# Patient Record
Sex: FEMALE | Race: BLACK OR AFRICAN AMERICAN | NOT HISPANIC OR LATINO
[De-identification: names, ages, dates, MRNs, and addresses within clinical notes are randomized per-mention and may not be internally consistent; named-entity substitution may affect disease eponyms.]

---

## 2017-04-06 ENCOUNTER — TRANSCRIPTION ENCOUNTER (OUTPATIENT)
Age: 22
End: 2017-04-06

## 2020-08-06 ENCOUNTER — EMERGENCY (EMERGENCY)
Facility: HOSPITAL | Age: 25
LOS: 1 days | Discharge: HOME | End: 2020-08-06
Attending: EMERGENCY MEDICINE | Admitting: EMERGENCY MEDICINE
Payer: COMMERCIAL

## 2020-08-06 VITALS
SYSTOLIC BLOOD PRESSURE: 107 MMHG | OXYGEN SATURATION: 100 % | DIASTOLIC BLOOD PRESSURE: 74 MMHG | RESPIRATION RATE: 16 BRPM | HEART RATE: 97 BPM | TEMPERATURE: 98 F

## 2020-08-06 VITALS
RESPIRATION RATE: 15 BRPM | OXYGEN SATURATION: 100 % | DIASTOLIC BLOOD PRESSURE: 80 MMHG | TEMPERATURE: 99 F | SYSTOLIC BLOOD PRESSURE: 117 MMHG | HEART RATE: 117 BPM

## 2020-08-06 PROCEDURE — 73564 X-RAY EXAM KNEE 4 OR MORE: CPT | Mod: 26,LT

## 2020-08-06 PROCEDURE — 73110 X-RAY EXAM OF WRIST: CPT | Mod: 26,77,LT

## 2020-08-06 PROCEDURE — 99284 EMERGENCY DEPT VISIT MOD MDM: CPT

## 2020-08-06 PROCEDURE — 73110 X-RAY EXAM OF WRIST: CPT | Mod: 26,LT

## 2020-08-06 PROCEDURE — 73130 X-RAY EXAM OF HAND: CPT | Mod: 26,LT

## 2020-08-06 PROCEDURE — 73090 X-RAY EXAM OF FOREARM: CPT | Mod: 26,LT

## 2020-08-06 PROCEDURE — 99053 MED SERV 10PM-8AM 24 HR FAC: CPT

## 2020-08-06 PROCEDURE — 70450 CT HEAD/BRAIN W/O DYE: CPT | Mod: 26

## 2020-08-06 RX ORDER — IBUPROFEN 200 MG
600 TABLET ORAL ONCE
Refills: 0 | Status: DISCONTINUED | OUTPATIENT
Start: 2020-08-06 | End: 2020-08-10

## 2020-08-06 RX ORDER — ACETAMINOPHEN 500 MG
975 TABLET ORAL ONCE
Refills: 0 | Status: COMPLETED | OUTPATIENT
Start: 2020-08-06 | End: 2020-08-06

## 2020-08-06 RX ADMIN — Medication 975 MILLIGRAM(S): at 08:31
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
## 2020-08-06 NOTE — ED PROVIDER NOTE - PHYSICAL EXAMINATION
PHYSICAL EXAM:   General: tired appearing but arousable, in no acute distress  HEENT: NC/AT, PERRLA, EOMI, airway patent, no c-spine tenderness to palpation  Cardiovascular: tachycardic rate and regular rhythm, + S1/S2  Respiratory: clear to auscultation bilaterally anteriorly, good aeration bilaterally, nonlabored respirations  Abdominal: soft, nontender, nondistended, no rebound, guarding or rigidity  Back: no midline spinal tenderness  Extremities: Left: scaphoid TTP. Distal radial and ulnar TTP. Distal forearm TTP. No gross deformities. Radial artery present and strong. Sensation grossly intact to light touch of the hand. Diminished  strength in L hand. L knee with abrasion over anterior patella with TTP to bone beneath. Full ROM of L knee with pain. +DP pulse. Sensation in tact to light touch of LLE.   Neuro: Alert and oriented x3. see above. CN2-12 intact, Moving all extremities except L hand (uses her R hand to assist with moving L hand). Sensation intact to light touch in upper and lower extremities. finger-to-nose WNL on R, limited on L 2/2 hand discomfort/weakness.   Psychiatric: appropriate mood and affect.   Skin: as above  -Bevelrey Dobson PGY-2

## 2020-08-06 NOTE — ED ADULT NURSE NOTE - NS ED NURSE LEVEL OF CONSCIOUSNESS MENTAL STATUS
The patient is a 53-year-old  who presents to clinic to establish care. She has a significant past medical history of hepatitis C status post treatment and cure, weight gain, and left axillary swelling.   The patient denied any chest pain, no sob, no fung, no  pnd, no orthopnea, no headache, no changes in vision, no numbness or tingling, no nausea, no diarrhea, no abdominal pain, no fevers, no chills, no bright red blood per rectum, no  difficulty urinating, no burning during micturition, no depressed mood, no other concerns.    

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
## 2020-08-06 NOTE — ED ADULT NURSE REASSESSMENT NOTE - NS ED NURSE REASSESS COMMENT FT1
Pt drowsy but arousable to voice and oriented x 4.   c/o wrist and leg pain but denies headache, denies neck pain, dizziness or nausea.   awaiting CT scan.   C collar in place.

## 2020-08-06 NOTE — ED PROVIDER NOTE - NS ED ROS FT
REVIEW OF SYSTEMS:  General:  no fever, no chills  Cardiac: no chest pain  Respiratory: no shortness of breath  Gastrointestinal: no abdominal pain   Extremities: +L wrist pain, +L knee pain  Skin: +L knee abrasion  -Beverley Dobson PGY-2
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
## 2020-08-06 NOTE — ED PROVIDER NOTE - PATIENT PORTAL LINK FT
You can access the FollowMyHealth Patient Portal offered by [facility] by registering at the following website: [email or web address]. By joining [facility]’s FollowMyHealth portal, you will also be able to view your health information using other applications (apps) compatible with our system.

## 2020-08-06 NOTE — ED ADULT NURSE NOTE - CHPI ED NUR SYMPTOMS NEG
no tingling/no nausea/no dizziness/no chills/no vomiting/no fever/no weakness/no decreased eating/drinking

## 2023-11-30 ENCOUNTER — EMERGENCY (EMERGENCY)
Facility: HOSPITAL | Age: 28
LOS: 1 days | Discharge: HOME | End: 2023-11-30
Attending: EMERGENCY MEDICINE
Payer: COMMERCIAL

## 2023-11-30 ENCOUNTER — TRANSCRIPTION ENCOUNTER (OUTPATIENT)
Age: 28
End: 2023-11-30

## 2023-11-30 VITALS
RESPIRATION RATE: 16 BRPM | HEART RATE: 95 BPM | TEMPERATURE: 99 F | SYSTOLIC BLOOD PRESSURE: 111 MMHG | DIASTOLIC BLOOD PRESSURE: 79 MMHG | OXYGEN SATURATION: 100 %

## 2023-11-30 VITALS
RESPIRATION RATE: 18 BRPM | SYSTOLIC BLOOD PRESSURE: 96 MMHG | HEART RATE: 97 BPM | WEIGHT: 130.07 LBS | DIASTOLIC BLOOD PRESSURE: 63 MMHG | TEMPERATURE: 99 F | OXYGEN SATURATION: 100 % | HEIGHT: 61 IN

## 2023-11-30 LAB
ALBUMIN SERPL ELPH-MCNC: 4.7 G/DL — SIGNIFICANT CHANGE UP (ref 3.3–5)
ALBUMIN SERPL ELPH-MCNC: 4.7 G/DL — SIGNIFICANT CHANGE UP (ref 3.3–5)
ALP SERPL-CCNC: 68 U/L — SIGNIFICANT CHANGE UP (ref 40–120)
ALP SERPL-CCNC: 68 U/L — SIGNIFICANT CHANGE UP (ref 40–120)
ALT FLD-CCNC: 11 U/L — SIGNIFICANT CHANGE UP (ref 10–45)
ALT FLD-CCNC: 11 U/L — SIGNIFICANT CHANGE UP (ref 10–45)
ANION GAP SERPL CALC-SCNC: 15 MMOL/L — SIGNIFICANT CHANGE UP (ref 5–17)
ANION GAP SERPL CALC-SCNC: 15 MMOL/L — SIGNIFICANT CHANGE UP (ref 5–17)
APTT BLD: 29.7 SEC — SIGNIFICANT CHANGE UP (ref 24.5–35.6)
APTT BLD: 29.7 SEC — SIGNIFICANT CHANGE UP (ref 24.5–35.6)
AST SERPL-CCNC: 17 U/L — SIGNIFICANT CHANGE UP (ref 10–40)
AST SERPL-CCNC: 17 U/L — SIGNIFICANT CHANGE UP (ref 10–40)
BASOPHILS # BLD AUTO: 0.03 K/UL — SIGNIFICANT CHANGE UP (ref 0–0.2)
BASOPHILS # BLD AUTO: 0.03 K/UL — SIGNIFICANT CHANGE UP (ref 0–0.2)
BASOPHILS NFR BLD AUTO: 0.3 % — SIGNIFICANT CHANGE UP (ref 0–2)
BASOPHILS NFR BLD AUTO: 0.3 % — SIGNIFICANT CHANGE UP (ref 0–2)
BILIRUB SERPL-MCNC: 0.3 MG/DL — SIGNIFICANT CHANGE UP (ref 0.2–1.2)
BILIRUB SERPL-MCNC: 0.3 MG/DL — SIGNIFICANT CHANGE UP (ref 0.2–1.2)
BLD GP AB SCN SERPL QL: NEGATIVE — SIGNIFICANT CHANGE UP
BLD GP AB SCN SERPL QL: NEGATIVE — SIGNIFICANT CHANGE UP
BUN SERPL-MCNC: 8 MG/DL — SIGNIFICANT CHANGE UP (ref 7–23)
BUN SERPL-MCNC: 8 MG/DL — SIGNIFICANT CHANGE UP (ref 7–23)
CALCIUM SERPL-MCNC: 9.8 MG/DL — SIGNIFICANT CHANGE UP (ref 8.4–10.5)
CALCIUM SERPL-MCNC: 9.8 MG/DL — SIGNIFICANT CHANGE UP (ref 8.4–10.5)
CHLORIDE SERPL-SCNC: 100 MMOL/L — SIGNIFICANT CHANGE UP (ref 96–108)
CHLORIDE SERPL-SCNC: 100 MMOL/L — SIGNIFICANT CHANGE UP (ref 96–108)
CO2 SERPL-SCNC: 24 MMOL/L — SIGNIFICANT CHANGE UP (ref 22–31)
CO2 SERPL-SCNC: 24 MMOL/L — SIGNIFICANT CHANGE UP (ref 22–31)
CREAT SERPL-MCNC: 0.82 MG/DL — SIGNIFICANT CHANGE UP (ref 0.5–1.3)
CREAT SERPL-MCNC: 0.82 MG/DL — SIGNIFICANT CHANGE UP (ref 0.5–1.3)
EGFR: 99 ML/MIN/1.73M2 — SIGNIFICANT CHANGE UP
EGFR: 99 ML/MIN/1.73M2 — SIGNIFICANT CHANGE UP
EOSINOPHIL # BLD AUTO: 0.05 K/UL — SIGNIFICANT CHANGE UP (ref 0–0.5)
EOSINOPHIL # BLD AUTO: 0.05 K/UL — SIGNIFICANT CHANGE UP (ref 0–0.5)
EOSINOPHIL NFR BLD AUTO: 0.5 % — SIGNIFICANT CHANGE UP (ref 0–6)
EOSINOPHIL NFR BLD AUTO: 0.5 % — SIGNIFICANT CHANGE UP (ref 0–6)
GLUCOSE SERPL-MCNC: 99 MG/DL — SIGNIFICANT CHANGE UP (ref 70–99)
GLUCOSE SERPL-MCNC: 99 MG/DL — SIGNIFICANT CHANGE UP (ref 70–99)
HCG SERPL-ACNC: 2159 MIU/ML — HIGH
HCG SERPL-ACNC: 2159 MIU/ML — HIGH
HCT VFR BLD CALC: 22.9 % — LOW (ref 34.5–45)
HCT VFR BLD CALC: 22.9 % — LOW (ref 34.5–45)
HGB BLD-MCNC: 8.1 G/DL — LOW (ref 11.5–15.5)
HGB BLD-MCNC: 8.1 G/DL — LOW (ref 11.5–15.5)
IMM GRANULOCYTES NFR BLD AUTO: 0.4 % — SIGNIFICANT CHANGE UP (ref 0–0.9)
IMM GRANULOCYTES NFR BLD AUTO: 0.4 % — SIGNIFICANT CHANGE UP (ref 0–0.9)
INR BLD: 1.07 RATIO — SIGNIFICANT CHANGE UP (ref 0.85–1.18)
INR BLD: 1.07 RATIO — SIGNIFICANT CHANGE UP (ref 0.85–1.18)
LYMPHOCYTES # BLD AUTO: 4.3 K/UL — HIGH (ref 1–3.3)
LYMPHOCYTES # BLD AUTO: 4.3 K/UL — HIGH (ref 1–3.3)
LYMPHOCYTES # BLD AUTO: 41.4 % — SIGNIFICANT CHANGE UP (ref 13–44)
LYMPHOCYTES # BLD AUTO: 41.4 % — SIGNIFICANT CHANGE UP (ref 13–44)
MCHC RBC-ENTMCNC: 27.7 PG — SIGNIFICANT CHANGE UP (ref 27–34)
MCHC RBC-ENTMCNC: 27.7 PG — SIGNIFICANT CHANGE UP (ref 27–34)
MCHC RBC-ENTMCNC: 35.4 GM/DL — SIGNIFICANT CHANGE UP (ref 32–36)
MCHC RBC-ENTMCNC: 35.4 GM/DL — SIGNIFICANT CHANGE UP (ref 32–36)
MCV RBC AUTO: 78.4 FL — LOW (ref 80–100)
MCV RBC AUTO: 78.4 FL — LOW (ref 80–100)
MONOCYTES # BLD AUTO: 0.58 K/UL — SIGNIFICANT CHANGE UP (ref 0–0.9)
MONOCYTES # BLD AUTO: 0.58 K/UL — SIGNIFICANT CHANGE UP (ref 0–0.9)
MONOCYTES NFR BLD AUTO: 5.6 % — SIGNIFICANT CHANGE UP (ref 2–14)
MONOCYTES NFR BLD AUTO: 5.6 % — SIGNIFICANT CHANGE UP (ref 2–14)
NEUTROPHILS # BLD AUTO: 5.39 K/UL — SIGNIFICANT CHANGE UP (ref 1.8–7.4)
NEUTROPHILS # BLD AUTO: 5.39 K/UL — SIGNIFICANT CHANGE UP (ref 1.8–7.4)
NEUTROPHILS NFR BLD AUTO: 51.8 % — SIGNIFICANT CHANGE UP (ref 43–77)
NEUTROPHILS NFR BLD AUTO: 51.8 % — SIGNIFICANT CHANGE UP (ref 43–77)
NRBC # BLD: 0 /100 WBCS — SIGNIFICANT CHANGE UP (ref 0–0)
NRBC # BLD: 0 /100 WBCS — SIGNIFICANT CHANGE UP (ref 0–0)
PLATELET # BLD AUTO: 247 K/UL — SIGNIFICANT CHANGE UP (ref 150–400)
PLATELET # BLD AUTO: 247 K/UL — SIGNIFICANT CHANGE UP (ref 150–400)
POTASSIUM SERPL-MCNC: 4.1 MMOL/L — SIGNIFICANT CHANGE UP (ref 3.5–5.3)
POTASSIUM SERPL-MCNC: 4.1 MMOL/L — SIGNIFICANT CHANGE UP (ref 3.5–5.3)
POTASSIUM SERPL-SCNC: 4.1 MMOL/L — SIGNIFICANT CHANGE UP (ref 3.5–5.3)
POTASSIUM SERPL-SCNC: 4.1 MMOL/L — SIGNIFICANT CHANGE UP (ref 3.5–5.3)
PROT SERPL-MCNC: 7.3 G/DL — SIGNIFICANT CHANGE UP (ref 6–8.3)
PROT SERPL-MCNC: 7.3 G/DL — SIGNIFICANT CHANGE UP (ref 6–8.3)
PROTHROM AB SERPL-ACNC: 11.2 SEC — SIGNIFICANT CHANGE UP (ref 9.5–13)
PROTHROM AB SERPL-ACNC: 11.2 SEC — SIGNIFICANT CHANGE UP (ref 9.5–13)
RBC # BLD: 2.92 M/UL — LOW (ref 3.8–5.2)
RBC # BLD: 2.92 M/UL — LOW (ref 3.8–5.2)
RBC # FLD: 13.6 % — SIGNIFICANT CHANGE UP (ref 10.3–14.5)
RBC # FLD: 13.6 % — SIGNIFICANT CHANGE UP (ref 10.3–14.5)
RH IG SCN BLD-IMP: POSITIVE — SIGNIFICANT CHANGE UP
RH IG SCN BLD-IMP: POSITIVE — SIGNIFICANT CHANGE UP
SODIUM SERPL-SCNC: 139 MMOL/L — SIGNIFICANT CHANGE UP (ref 135–145)
SODIUM SERPL-SCNC: 139 MMOL/L — SIGNIFICANT CHANGE UP (ref 135–145)
WBC # BLD: 10.39 K/UL — SIGNIFICANT CHANGE UP (ref 3.8–10.5)
WBC # BLD: 10.39 K/UL — SIGNIFICANT CHANGE UP (ref 3.8–10.5)
WBC # FLD AUTO: 10.39 K/UL — SIGNIFICANT CHANGE UP (ref 3.8–10.5)
WBC # FLD AUTO: 10.39 K/UL — SIGNIFICANT CHANGE UP (ref 3.8–10.5)

## 2023-11-30 PROCEDURE — 84295 ASSAY OF SERUM SODIUM: CPT

## 2023-11-30 PROCEDURE — 85610 PROTHROMBIN TIME: CPT

## 2023-11-30 PROCEDURE — 82330 ASSAY OF CALCIUM: CPT

## 2023-11-30 PROCEDURE — 83605 ASSAY OF LACTIC ACID: CPT

## 2023-11-30 PROCEDURE — 85730 THROMBOPLASTIN TIME PARTIAL: CPT

## 2023-11-30 PROCEDURE — 86850 RBC ANTIBODY SCREEN: CPT

## 2023-11-30 PROCEDURE — 86901 BLOOD TYPING SEROLOGIC RH(D): CPT

## 2023-11-30 PROCEDURE — 85025 COMPLETE CBC W/AUTO DIFF WBC: CPT

## 2023-11-30 PROCEDURE — 99285 EMERGENCY DEPT VISIT HI MDM: CPT

## 2023-11-30 PROCEDURE — 93975 VASCULAR STUDY: CPT

## 2023-11-30 PROCEDURE — 99232 SBSQ HOSP IP/OBS MODERATE 35: CPT | Mod: GC

## 2023-11-30 PROCEDURE — 82947 ASSAY GLUCOSE BLOOD QUANT: CPT

## 2023-11-30 PROCEDURE — 36415 COLL VENOUS BLD VENIPUNCTURE: CPT

## 2023-11-30 PROCEDURE — 82803 BLOOD GASES ANY COMBINATION: CPT

## 2023-11-30 PROCEDURE — 82435 ASSAY OF BLOOD CHLORIDE: CPT

## 2023-11-30 PROCEDURE — 99284 EMERGENCY DEPT VISIT MOD MDM: CPT | Mod: GC

## 2023-11-30 PROCEDURE — 85018 HEMOGLOBIN: CPT

## 2023-11-30 PROCEDURE — 93975 VASCULAR STUDY: CPT | Mod: 26

## 2023-11-30 PROCEDURE — 86900 BLOOD TYPING SEROLOGIC ABO: CPT

## 2023-11-30 PROCEDURE — 76830 TRANSVAGINAL US NON-OB: CPT | Mod: 26

## 2023-11-30 PROCEDURE — 84702 CHORIONIC GONADOTROPIN TEST: CPT

## 2023-11-30 PROCEDURE — 99285 EMERGENCY DEPT VISIT HI MDM: CPT | Mod: 25

## 2023-11-30 PROCEDURE — 85014 HEMATOCRIT: CPT

## 2023-11-30 PROCEDURE — 76830 TRANSVAGINAL US NON-OB: CPT

## 2023-11-30 PROCEDURE — 80053 COMPREHEN METABOLIC PANEL: CPT

## 2023-11-30 PROCEDURE — 84132 ASSAY OF SERUM POTASSIUM: CPT

## 2023-11-30 RX ORDER — ACETAMINOPHEN 500 MG
975 TABLET ORAL ONCE
Refills: 0 | Status: COMPLETED | OUTPATIENT
Start: 2023-11-30 | End: 2023-11-30

## 2023-11-30 RX ADMIN — Medication 975 MILLIGRAM(S): at 23:24

## 2023-11-30 NOTE — ED PROVIDER NOTE - CHIEF COMPLAINT
MRN:[id number]                      After Visit Summary   9/18/2018    Fabiana Torres    MRN: [id number]           Visit Information        Provider Department      9/18/2018 9:30 AM Mandy Hogan AuD; [facility] Audiology        Your next 10 appointments already scheduled     Aug 06, 2019  9:15 AM CDT   Return Visit with Malgorzata Mcmahon MD   [facility] ([facility])    [facility]  2nd Floor - [address]  [address] [place] [id number]   [phone]              MyChart Information     [employer] [facility] lets you send messages to your doctor, view your test results, renew your prescriptions, schedule appointments and more. To sign up, go to [email or web address], contact your [place] clinic or call [phone] during business hours.            Care EveryWhere ID     This is your Care EveryWhere ID. This could be used by other organizations to access your [place] medical records  [id number]        Equal Access to Services     RAO DREW AH: Hadii aad ku hadasho Soomaali, waaxda luqadaha, qaybta kaalmada adeegyada, steve cabrera. So [facility] [phone].    ATENCIÓN: Si habla español, tiene a banda disposición servicios gratuitos de asistencia lingüística. Llame al [phone].    We comply with applicable federal civil rights laws and [place] laws. We do not discriminate on the basis of race, color, national origin, age, disability, sex, sexual orientation, or gender identity.            
The patient is a 28y Female complaining of

## 2023-11-30 NOTE — CONSULT NOTE ADULT - ASSESSMENT
A/P: HPI: 28y  LMP  at 10w2d presenting for vaginal bleeding and cramping x3d, GYN consulted for pregnancy of unknown location. Pt with overall improvement in cramping and vaginal bleeding today, non-tender abdominal exam without vaginal spotting at time of evaluation. Pt afebrile, clinically and hemodynamically stable. H/H anemic to 8.1/22.9 likely in setting of heavy vaginal bleeding 3d ago. TVUS demonstrating no IUP with probable blood products in the endometrial canal, thick walled adnexal cystic structure (paraovarian walled cyst) 1.2x0.9x0.8cm separate from R ovary from which ectopic pregnancy cannot be excluded. Clinical picture suspicious for spontaneous , r/o ectopic pregnancy.     -patient to follow up in 48 hours for repeat b-HCG with private GYN Dr. Hsieh  -Rh type: O+. No need for rhogam at this time.   -Ectopic precautions reviewed with patient.  Discussed with patient importance of follow up for b-HCG given unknown pregnancy location at this time.  Patient expressed understanding.  All questions and concerns addressed to patient's apparent satisfaction.   -patient stable for d/c home from GYN perspective. Primary management per ED team.      D/w Dr. Graves, GYN service attending  [facility] PGY2

## 2023-11-30 NOTE — ED PROVIDER NOTE - OBJECTIVE STATEMENT
28-year-old female, no significant medical history, G3, P0 presenting for vaginal bleeding.  The patient went to see her gynecologist today Dr. Olive Hsieh and was told to come to the ED for further evaluation.  The patient's LMP was 9/23.  The patient reports that she started spotting about a week ago and 3 days ago started having heavy vaginal bleeding.  Over the last day the patient reports that she went through about 3 pads in 1 hour.  The patient was told to come in to rule out miscarriage versus ectopic pregnancy.  The patient has not had a confirmed IUP.  The patient reports that for the past 3 days she has been having abdominal cramping but today her symptoms have since subsided.  The patient reports that she had an episode of chest pain and shortness of breath today but is no longer experiencing that.  The patient denies fevers, chills, nausea, vomiting headache, visual changes, urinary symptoms, diarrhea.

## 2023-11-30 NOTE — ED ADULT NURSE NOTE - OBJECTIVE STATEMENT
Pt is 28y F, , 2 previous D&Cs, complaining of vaginal bleeding. LMP 09/15/2023, currently 9 weeks pregnant. Pt reports vaginal bleeding x 8 days, spotting worsening to heavy bleeding. Today reports soaking 2 regular pads within an hour with clots. Endorses lower abdominal cramping, partially relieved by Aleve. Upon assessment, A&Ox4, vaginal bleeding present, endorses mild abdominal cramping and lightheadedness, BP 90s/60s. Repeat BP 120s/70s. Family at bedside.

## 2023-11-30 NOTE — CONSULT NOTE ADULT - SUBJECTIVE AND OBJECTIVE BOX
Gyn Consult Note  MARTHA Cristinay  Female [id number]    HPI:      Name of GYN Physician:     OBHx:    GYNHx: Denies fibroids, cysts, endometriosis, STI's, Abnormal pap smears     PAST MEDICAL & SURGICAL HISTORY:  No pertinent past medical history      No significant past surgical history          Meds:     Allergies    No Known Allergies    Intolerances        FAMILY HISTORY:      Social:     Vital Signs Last 24 Hrs  T(C): 37.2 (30 Nov 2023 16:56), Max: 37.2 (30 Nov 2023 16:56)  T(F): 99 (30 Nov 2023 16:56), Max: 99 (30 Nov 2023 16:56)  HR: 93 (30 Nov 2023 17:30) (93 - 97)  BP: 126/81 (30 Nov 2023 17:30) (96/63 - 126/81)  BP(mean): --  RR: 16 (30 Nov 2023 17:30) (16 - 18)  SpO2: 100% (30 Nov 2023 17:30) (100% - 100%)    Parameters below as of 30 Nov 2023 17:30  Patient On (Oxygen Delivery Method): room air        Physical Exam:   General: sitting comfortably in bed, NAD   CV: RRR  Lungs: CTAB  Back: No CVA tenderness  Abd: Soft, non-tender, non-distended.  Bowel sounds present.    : No bleeding on pad. External labia wnl. Uterus wnl, nontender. Adnexa non palpable b/l. No CMT. Cervix closed.   Speculum Exam: No active bleeding from os.  Physiologic discharge.    Ext: non-tender b/l, no edema     LABS:                              8.1    10.39 )-----------( 247      ( 30 Nov 2023 17:58 )             22.9     11-30    139  |  100  |  8   ----------------------------<  99  4.1   |  24  |  0.82    Ca    9.8      30 Nov 2023 17:58    TPro  7.3  /  Alb  4.7  /  TBili  0.3  /  DBili  x   /  AST  17  /  ALT  11  /  AlkPhos  68  11-30    PT/INR - ( 30 Nov 2023 17:58 )   PT: 11.2 sec;   INR: 1.07 ratio         PTT - ( 30 Nov 2023 17:58 )  PTT:29.7 sec  Urinalysis Basic - ( 30 Nov 2023 17:58 )    Color: x / Appearance: x / SG: x / pH: x  Gluc: 99 mg/dL / Ketone: x  / Bili: x / Urobili: x   Blood: x / Protein: x / Nitrite: x   Leuk Esterase: x / RBC: x / WBC x   Sq Epi: x / Non Sq Epi: x / Bacteria: x        RADIOLOGY & ADDITIONAL STUDIES:   Gyn Consult Note  MARTHA FLOWERS  28y  Female [id number]    HPI: 28y  LMP  at 10w2d presenting for     Of note, this is a desired pregnancy.      Name of GYN Physician: Dr. 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 Denies fibroids, cysts, endometriosis, STI's, Abnormal pap smears   PMH: denies  PSH: denies  Meds: denies  NKDA    Vital Signs Last 24 Hrs  T(C): 37.2 (2023 16:56), Max: 37.2 (2023 16:56)  T(F): 99 (2023 16:56), Max: 99 (2023 16:56)  HR: 93 (2023 17:30) (93 - 97)  BP: 126/81 (2023 17:30) (96/63 - 126/81)  BP(mean): --  RR: 16 (2023 17:30) (16 - 18)  SpO2: 100% (2023 17:30) (100% - 100%)    Parameters below as of 2023 17:30  Patient On (Oxygen Delivery Method): room air        Physical Exam:   General: sitting comfortably in bed, NAD   CV: RRR  Lungs: CTAB  Back: No CVA tenderness  Abd: Soft, non-tender, non-distended.  Bowel sounds present.    : No bleeding on pad. External labia wnl. Uterus wnl, nontender. Adnexa non palpable b/l. No CMT. Cervix closed.   Speculum Exam: No active bleeding from os.  Physiologic discharge.    Ext: non-tender b/l, no edema     LABS:                              8.1    10.39 )-----------( 247      ( 2023 17:58 )             22.9     11-30    139  |  100  |  8   ----------------------------<  99  4.1   |  24  |  0.82    Ca    9.8      2023 17:58    TPro  7.3  /  Alb  4.7  /  TBili  0.3  /  DBili  x   /  AST  17  /  ALT  11  /  AlkPhos  68  11-30    PT/INR - ( 2023 17:58 )   PT: 11.2 sec;   INR: 1.07 ratio         PTT - ( 2023 17:58 )  PTT:29.7 sec  Urinalysis Basic - ( 2023 17:58 )    Color: x / Appearance: x / SG: x / pH: x  Gluc: 99 mg/dL / Ketone: x  / Bili: x / Urobili: x   Blood: x / Protein: x / Nitrite: x   Leuk Esterase: x / RBC: x / WBC x   Sq Epi: x / Non Sq Epi: x / Bacteria: x        RADIOLOGY & ADDITIONAL STUDIES:   Gyn Consult Note  MARTHA FLOWERS  28y  Female [id number]    HPI: 28y  LMP  at 10w2d presenting for vaginal bleeding and cramping x3d, GYN consulted for pregnancy of unknown location. Pt had not yet established care in this pregnancy, and followed up with Dr. Hsieh in the office today due to concern for miscarriage. Pt reports she was sent in from the office for a sonogram. Pt reports the bleeding and cramping were initially heavy and severe, respectively. Pt reports saturating multiple pads an hour 3d ago with passage of clots. Pt also reports cramping was severe at that time despite taking Advil. Pt reports today the bleeding has lightened and required 2 pads in total. Reports cramping is still present but is more mild, took Advil at 1pm today with improvement noted. Pt also reports feeling weak because she did not eat much during the day today. Pt denies fevers, chills, n/v, vaginal discharge, syncope. Of note, this is a desired pregnancy.    Name of GYN Physician: Dr. 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 Denies fibroids, cysts, endometriosis, STI's, Abnormal pap smears   PMH: denies  PSH: denies  Meds: denies  NKDA    Vital Signs Last 24 Hrs  T(C): 37.2 (2023 16:56), Max: 37.2 (2023 16:56)  T(F): 99 (2023 16:56), Max: 99 (2023 16:56)  HR: 93 (2023 17:30) (93 - 97)  BP: 126/81 (2023 17:30) (96/63 - 126/81)  BP(mean): --  RR: 16 (2023 17:30) (16 - 18)  SpO2: 100% (2023 17:30) (100% - 100%)    Parameters below as of 2023 17:30  Patient On (Oxygen Delivery Method): room air        Physical Exam:   General: sitting comfortably in bed, NAD   CV: RRR  Lungs: CTAB  Back: No CVA tenderness  Abd: Soft, non-tender, non-distended.  Bowel sounds present.    : Pad approx 10% saturated. External labia wnl. Uterus 10cm size, nontender. Adnexa non palpable b/l. No CMT. Cervix 0.5cm dilated  Speculum Exam: 2cc dark red blood in vaginal vault, no active bleeding from external os noted.  Ext: non-tender b/l, no edema     LABS:                              8.1    10.39 )-----------( 247      ( 2023 17:58 )             22.9     11    139  |  100  |  8   ----------------------------<  99  4.1   |  24  |  0.82    Ca    9.8      2023 17:58    TPro  7.3  /  Alb  4.7  /  TBili  0.3  /  DBili  x   /  AST  17  /  ALT  11  /  AlkPhos  68  11-    PT/INR - ( 2023 17:58 )   PT: 11.2 sec;   INR: 1.07 ratio         PTT - ( 2023 17:58 )  PTT:29.7 sec  Urinalysis Basic - ( 2023 17:58 )    Color: x / Appearance: x / SG: x / pH: x  Gluc: 99 mg/dL / Ketone: x  / Bili: x / Urobili: x   Blood: x / Protein: x / Nitrite: x   Leuk Esterase: x / RBC: x / WBC x   Sq Epi: x / Non Sq Epi: x / Bacteria: x        RADIOLOGY & ADDITIONAL STUDIES:  < from: US Transvaginal (23 @ 18:50) >  ACC: [id number] EXAM:  US DPLX PELVIC   ORDERED BY:  JEREMY BYRNES     ACC: [id number] EXAM:  US TRANSVAGINAL   ORDERED BY:  JEREMY BYRNES     PROCEDURE DATE:  2023          INTERPRETATION:  CLINICAL INFORMATION: Heavy vaginal bleeding. Positive   beta hCG    LMP: 2023    COMPARISON: None available.    TECHNIQUE:  Endovaginal pelvic sonogram as per order. Transabdominal pelvic sonogram   was also performed as part of our standard protocol. Color and Spectral   Doppler was performed.    FINDINGS:  Uterus: 7.0 x 3.5 x 4.6 cm. Within normal limits.  Endometrium: 14 mm. Distended with heterogenous mixed echogenic material,   likely blood products.    Right ovary: 2.1 x 1.4 x 1.8 cm. Paraovarian walled cyst measuring 1.2 x   0.9 x 0.8 cm.Normal arterial and venous waveforms.  Left ovary: 2.2 x 1.5 x 1.16. Cyst measuring 1.0 x 0.8 x 1.2 cm. Normal   arterial and venous waveforms.    Fluid: None.    IMPRESSION:  No evidence of an intrauterine gestation. Probable blood products in the   endometrial canal. There is a thick-walled right adnexal cystic structure   separate from the right ovary and ectopic pregnancy cannot be excluded.    --- End of Report ---           TONY COLE MD; Resident Radiologist  This document has been electronically signed.  ALISIA ESPINOZA MD; Attending Radiologist  This document has been electronically signed. 2023  7:30PM    < end of copied text >

## 2023-11-30 NOTE — ED PROVIDER NOTE - NSFOLLOWUPINSTRUCTIONS_ED_ALL_ED_FT
-- Please follow-up with your OBGYN in 2 days for hcg check and follow up, but see medical sooner if your symptoms persist or worsen.  Please call tomorrow for an appointment.  If you cannot follow-up with your primary doctor please return to the ED for any urgent issues.  -- You were given a copy of your labs and imaging.  Please go-over these with your doctor(s).   -- If you have any worsening of symptoms or any other concerns please see your doctor or return to the ED immediately.

## 2023-11-30 NOTE — ED PROVIDER NOTE - PHYSICAL EXAMINATION
GENERAL: Awake, alert, NAD  HEENT: NC/AT, moist mucous membranes, PERRL, EOMI  LUNGS: CTAB, no wheezes or crackles   CARDIAC: RRR, no m/r/g  ABDOMEN: Soft, non tender, non distended, no rebound, no guarding  : clots visible in vaginal canal, oozing of blood from vaginal canal, no pooling.   BACK: No midline spinal tenderness, no CVA tenderness  EXT: No edema, no calf tenderness, 2+ DP pulses bilaterally, no deformities.  NEURO: A&Ox3. Moving all extremities.  SKIN: Warm and dry. No rash.  PSYCH: Normal affect.

## 2023-11-30 NOTE — ED PROVIDER NOTE - PROGRESS NOTE DETAILS
US with concern for Ectopic. No IUP seen. GYN consulted Discussed with OB/GYN, low suspicion for ectopic pregnancy after reviewing scan and given patient's exam.  Patient with improvement in vaginal bleeding while in the emergency department.  As per OB/GYN only scant vaginal bleeding on their exam.  Patient well-appearing, no lightheadedness, dizziness, chest pain, shortness of breath with ambulation.  Likely miscarriage as per OB/GYN.  Recommending patient follow-up outpatient with her OB/GYN for serial hCGs.  Discussed all findings with patient.  Patient in agreement with plan.  Answered all questions and gave return precautions.

## 2023-11-30 NOTE — ED PROVIDER NOTE - PATIENT PORTAL LINK FT
You can access the FollowMyHealth Patient Portal offered by [facility] by registering at the following website: [email or web address]. By joining [facility]’s FollowMyHealth portal, you will also be able to view your health information using other applications (apps) compatible with our system.

## 2023-11-30 NOTE — ED ADULT NURSE NOTE - NSFALLRISKINTERV_ED_ALL_ED

## 2023-11-30 NOTE — ED PROVIDER NOTE - CLINICAL SUMMARY MEDICAL DECISION MAKING FREE TEXT BOX
28-year-old female, no significant medical history, G3, P0 presenting for vaginal bleeding.  The patient went to see her gynecologist today Dr. Olive Hsieh and was told to come to the ED for further evaluation. The patient denies fevers, chills, nausea, vomiting headache, visual changes, urinary symptoms, diarrhea. VSS. PE. clots visible in vaginal canal, oozing of blood from vaginal canal, no pooling.     She was not limited to ectopic pregnancy, threatened , inevitable , coagulopathy, concern for hematological derangements.  Will obtain basic labs, UA, UC, transvaginal ultrasound, coags and type and screen.  Dispo pending labs imaging and reassessment. 28-year-old female, no significant medical history, G3, P0 presenting for vaginal bleeding.  The patient went to see her gynecologist today Dr. Olive Hsieh and was told to come to the ED for further evaluation. The patient denies fevers, chills, nausea, vomiting headache, visual changes, urinary symptoms, diarrhea. VSS. PE. clots visible in vaginal canal, oozing of blood from vaginal canal, no pooling. DDX not limited to ectopic pregnancy, threatened , inevitable , coagulopathy, concern for hematological derangements.  Will obtain basic labs, UA, UC, transvaginal ultrasound, coags and type and screen.  Dispo pending labs imaging and reassessment.    Gege Braun MD - Attending Physician: Pt here at approx 11wks GA by LMP with significant vaginal bleeding for 3 days after spotting for 1 week. No confirmed IUP. Nontoxic, VSS, not significantly bleeding currently. R/o ectopic - labs, US

## 2023-11-30 NOTE — ED ADULT NURSE NOTE - NS ED NURSE LEVEL OF CONSCIOUSNESS ORIENTATION
DISPLAY PLAN FREE TEXT
Oriented - self; Oriented - place; Oriented - time

## 2023-12-03 NOTE — CHART NOTE - NSCHARTNOTEFT_GEN_A_CORE
28y  LMP  at 10w2d presented on  for vaginal bleeding and cramping x3d, found to have pregnancy of unknown location. Called patient to confirm follow up at private GYN Dr. Hsieh. She did not answer. Left voicemail with reminder to follow up with Dr. Hsieh.    Macarena Aquino, PGY2

## 2023-12-04 NOTE — CHART NOTE - NSCHARTNOTEFT_GEN_A_CORE
28y  LMP  at 10w2d presented on  for vaginal bleeding and cramping x3d, found to have pregnancy of unknown location. Called patient again to confirm follow up at private GYN Dr. Hsieh. She did not answer. Left appropriately vague voicemail with reminder to follow up with outpatient provider for repeat bloodwork.     Will keep patient on [facility] GYN beta list and attempt phone call reminder one additional time, then will send certified letter.  Abebe Cates PGY2 28y  LMP  at 10w2d presented on  for vaginal bleeding and cramping x3d, found to have pregnancy of unknown location. Called patient again to confirm follow up at private GYN Dr. Hsieh. She did not answer. Left appropriately vague voicemail with reminder to follow up with outpatient provider for repeat bloodwork.     Will keep patient on [facility] GYN beta list and attempt phone call reminder one additional time, then will send certified letter.  Abebe Cates PGY2

## 2023-12-05 NOTE — CHART NOTE - NSCHARTNOTEFT_GEN_A_CORE
28y  LMP  at 10w2d presented on  for vaginal bleeding and cramping x3d, found to have pregnancy of unknown location.     Called patient to confirm outpatient GYN but patient did not answer. Left message reminding patient to follow with her GYN or return to the ED for further care.     As several attempts have been made to contact patient, certified letter will be sent.       Jorge Colon PGY1

## 2024-01-03 ENCOUNTER — APPOINTMENT (OUTPATIENT)
Dept: OBGYN | Facility: CLINIC | Age: 29
End: 2024-01-03

## 2025-01-05 NOTE — ED ADULT NURSE NOTE - NSFALLCONCLUSION_ED_ALL_ED
For information on Fall & Injury Prevention, visit: [email or web address] OR  [email or web address] OR  https://www.cdc.gov/steadi/patient.html Fall Risk